# Patient Record
Sex: FEMALE | URBAN - METROPOLITAN AREA
[De-identification: names, ages, dates, MRNs, and addresses within clinical notes are randomized per-mention and may not be internally consistent; named-entity substitution may affect disease eponyms.]

---

## 2021-06-18 ENCOUNTER — OFFICE VISIT (OUTPATIENT)
Dept: URBAN - METROPOLITAN AREA CLINIC 7 | Facility: CLINIC | Age: 61
End: 2021-06-18
Payer: COMMERCIAL

## 2021-06-18 PROCEDURE — 99205 OFFICE O/P NEW HI 60 MIN: CPT | Performed by: OPHTHALMOLOGY

## 2021-06-18 PROCEDURE — 67145 PROPH RTA DTCHMNT PC: CPT | Performed by: OPHTHALMOLOGY

## 2021-06-18 PROCEDURE — 92134 CPTRZ OPH DX IMG PST SGM RTA: CPT | Performed by: OPHTHALMOLOGY

## 2021-06-18 ASSESSMENT — INTRAOCULAR PRESSURE
OS: 10
OD: 11

## 2021-06-18 NOTE — IMPRESSION/PLAN
Impression: Vitreous degeneration, right eye: H43.811. Right.  Plan: --hemorrhagic PVD OD
--treat RT as above
--RDW discussed

## 2021-06-18 NOTE — IMPRESSION/PLAN
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 Plan: --exam confirms a peripheral operculated retinal tear
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
--r/b/a of laser retinopexy, cryotherapy, and observation discussed --pt understands risk of RD and vision loss w/o treatment
--pt understands risk of treatment, inc pain, vision loss, floaters --pt elects to proceed with indirect laser retinopexy today
--no complications encountered RTC 1 week for TEDDY OD

## 2021-06-24 ENCOUNTER — POST-OPERATIVE VISIT (OUTPATIENT)
Dept: URBAN - METROPOLITAN AREA CLINIC 41 | Facility: CLINIC | Age: 61
End: 2021-06-24
Payer: COMMERCIAL

## 2021-06-24 DIAGNOSIS — H33.319 OPERCULUM OF RETINA OF EYE W/O DETACHMENT: Primary | ICD-10-CM

## 2021-06-24 PROCEDURE — 99024 POSTOP FOLLOW-UP VISIT: CPT | Performed by: OPHTHALMOLOGY

## 2021-06-24 ASSESSMENT — INTRAOCULAR PRESSURE
OD: 13
OS: 15

## 2021-06-24 NOTE — IMPRESSION/PLAN
Impression: S/P Laser Retinal Tear OD - . Operculum of retina of eye w/o detachment  H33.319. Plan: Retinal hole is completely surrounded by laser scarring. No new RD/RT upon exam x 360. RDW discussed. 

RTC 3 months DFE/OCT OU

## 2021-09-30 ENCOUNTER — OFFICE VISIT (OUTPATIENT)
Dept: URBAN - METROPOLITAN AREA CLINIC 41 | Facility: CLINIC | Age: 61
End: 2021-09-30
Payer: COMMERCIAL

## 2021-09-30 DIAGNOSIS — H25.13 AGE-RELATED NUCLEAR CATARACT, BILATERAL: ICD-10-CM

## 2021-09-30 DIAGNOSIS — H43.811 VITREOUS DEGENERATION, RIGHT EYE: ICD-10-CM

## 2021-09-30 PROCEDURE — 99213 OFFICE O/P EST LOW 20 MIN: CPT | Performed by: OPHTHALMOLOGY

## 2021-09-30 PROCEDURE — 92134 CPTRZ OPH DX IMG PST SGM RTA: CPT | Performed by: OPHTHALMOLOGY

## 2021-09-30 ASSESSMENT — INTRAOCULAR PRESSURE
OS: 16
OD: 14

## 2021-09-30 NOTE — IMPRESSION/PLAN
Impression: Operculum of retina of eye w/o detachment  H33.319. S/P Laser Retinal Tear OD 06/18/2021 OCT: good mac contour, no ERM/CME Plan: --retinal hole is completely surrounded by laser scarring
--no new RD/RT upon exam x 360
--RDW discussed RTC PRN

## 2021-09-30 NOTE — IMPRESSION/PLAN
Impression: Vitreous degeneration, right eye: H43.811. Right.  Plan: --hemorrhagic PVD OD
--RDW discussed